# Patient Record
Sex: FEMALE | Race: OTHER | Employment: UNEMPLOYED | URBAN - METROPOLITAN AREA
[De-identification: names, ages, dates, MRNs, and addresses within clinical notes are randomized per-mention and may not be internally consistent; named-entity substitution may affect disease eponyms.]

---

## 2020-09-02 ENCOUNTER — HOSPITAL ENCOUNTER (OUTPATIENT)
Facility: CLINIC | Age: 70
Discharge: HOME | End: 2020-09-02

## 2020-09-02 LAB
TROPONIN INTERP: NORMAL
TROPONIN T: NORMAL NG/ML
TROPONIN, HIGH SENSITIVITY: 9 NG/L (ref 0–14)

## 2020-09-02 PROCEDURE — 93005 ELECTROCARDIOGRAM TRACING: CPT

## 2020-09-02 PROCEDURE — 84484 ASSAY OF TROPONIN QUANT: CPT

## 2020-09-02 PROCEDURE — 36415 COLL VENOUS BLD VENIPUNCTURE: CPT

## 2020-09-03 LAB
EKG ATRIAL RATE: 57 BPM
EKG P AXIS: 36 DEGREES
EKG P-R INTERVAL: 122 MS
EKG Q-T INTERVAL: 430 MS
EKG QRS DURATION: 98 MS
EKG QTC CALCULATION (BAZETT): 418 MS
EKG R AXIS: 39 DEGREES
EKG T AXIS: 57 DEGREES
EKG VENTRICULAR RATE: 57 BPM

## 2021-11-06 ENCOUNTER — HOSPITAL ENCOUNTER (EMERGENCY)
Age: 71
Discharge: HOME | End: 2021-11-06
Attending: EMERGENCY MEDICINE

## 2021-11-06 ENCOUNTER — APPOINTMENT (OUTPATIENT)
Dept: GENERAL RADIOLOGY | Age: 71
End: 2021-11-06

## 2021-11-06 VITALS
DIASTOLIC BLOOD PRESSURE: 70 MMHG | OXYGEN SATURATION: 97 % | BODY MASS INDEX: 24.75 KG/M2 | WEIGHT: 139.7 LBS | HEIGHT: 63 IN | HEART RATE: 66 BPM | RESPIRATION RATE: 18 BRPM | SYSTOLIC BLOOD PRESSURE: 124 MMHG | TEMPERATURE: 98.1 F

## 2021-11-06 DIAGNOSIS — U07.1 COVID-19: ICD-10-CM

## 2021-11-06 DIAGNOSIS — U07.1 COVID-19: Primary | ICD-10-CM

## 2021-11-06 LAB
ABSOLUTE EOS #: 0.1 K/UL (ref 0–0.44)
ABSOLUTE IMMATURE GRANULOCYTE: 0.03 K/UL (ref 0–0.3)
ABSOLUTE LYMPH #: 1.58 K/UL (ref 1.1–3.7)
ABSOLUTE MONO #: 0.72 K/UL (ref 0.1–1.2)
ALBUMIN SERPL-MCNC: 4.1 G/DL (ref 3.5–5.2)
ALBUMIN/GLOBULIN RATIO: ABNORMAL (ref 1–2.5)
ALP BLD-CCNC: 89 U/L (ref 35–104)
ALT SERPL-CCNC: 13 U/L (ref 5–33)
ANION GAP SERPL CALCULATED.3IONS-SCNC: 10 MMOL/L (ref 9–17)
AST SERPL-CCNC: 18 U/L
BASOPHILS # BLD: 1 % (ref 0–2)
BASOPHILS ABSOLUTE: 0.06 K/UL (ref 0–0.2)
BILIRUB SERPL-MCNC: 0.25 MG/DL (ref 0.3–1.2)
BUN BLDV-MCNC: 19 MG/DL (ref 8–23)
BUN/CREAT BLD: 27 (ref 9–20)
CALCIUM SERPL-MCNC: 9.2 MG/DL (ref 8.6–10.4)
CHLORIDE BLD-SCNC: 100 MMOL/L (ref 98–107)
CO2: 29 MMOL/L (ref 20–31)
CREAT SERPL-MCNC: 0.71 MG/DL (ref 0.5–0.9)
DIFFERENTIAL TYPE: ABNORMAL
EOSINOPHILS RELATIVE PERCENT: 1 % (ref 1–4)
GFR AFRICAN AMERICAN: >60 ML/MIN
GFR NON-AFRICAN AMERICAN: >60 ML/MIN
GFR SERPL CREATININE-BSD FRML MDRD: ABNORMAL ML/MIN/{1.73_M2}
GFR SERPL CREATININE-BSD FRML MDRD: ABNORMAL ML/MIN/{1.73_M2}
GLUCOSE BLD-MCNC: 97 MG/DL (ref 70–99)
HCT VFR BLD CALC: 36.8 % (ref 36.3–47.1)
HEMOGLOBIN: 11.7 G/DL (ref 11.9–15.1)
IMMATURE GRANULOCYTES: 0 %
LIPASE: 22 U/L (ref 13–60)
LYMPHOCYTES # BLD: 22 % (ref 24–43)
MAGNESIUM: 2.1 MG/DL (ref 1.6–2.6)
MCH RBC QN AUTO: 28 PG (ref 25.2–33.5)
MCHC RBC AUTO-ENTMCNC: 31.8 G/DL (ref 28.4–34.8)
MCV RBC AUTO: 88 FL (ref 82.6–102.9)
MONOCYTES # BLD: 10 % (ref 3–12)
NRBC AUTOMATED: 0 PER 100 WBC
PDW BLD-RTO: 13.5 % (ref 11.8–14.4)
PLATELET # BLD: 249 K/UL (ref 138–453)
PLATELET ESTIMATE: ABNORMAL
PMV BLD AUTO: 11.1 FL (ref 8.1–13.5)
POTASSIUM SERPL-SCNC: 3 MMOL/L (ref 3.7–5.3)
RBC # BLD: 4.18 M/UL (ref 3.95–5.11)
RBC # BLD: ABNORMAL 10*6/UL
SEG NEUTROPHILS: 66 % (ref 36–65)
SEGMENTED NEUTROPHILS ABSOLUTE COUNT: 4.55 K/UL (ref 1.5–8.1)
SODIUM BLD-SCNC: 139 MMOL/L (ref 135–144)
TOTAL PROTEIN: 6.9 G/DL (ref 6.4–8.3)
WBC # BLD: 7 K/UL (ref 3.5–11.3)
WBC # BLD: ABNORMAL 10*3/UL

## 2021-11-06 PROCEDURE — 83690 ASSAY OF LIPASE: CPT

## 2021-11-06 PROCEDURE — 99283 EMERGENCY DEPT VISIT LOW MDM: CPT

## 2021-11-06 PROCEDURE — 2580000003 HC RX 258: Performed by: EMERGENCY MEDICINE

## 2021-11-06 PROCEDURE — 36415 COLL VENOUS BLD VENIPUNCTURE: CPT

## 2021-11-06 PROCEDURE — 71045 X-RAY EXAM CHEST 1 VIEW: CPT

## 2021-11-06 PROCEDURE — 6360000002 HC RX W HCPCS: Performed by: EMERGENCY MEDICINE

## 2021-11-06 PROCEDURE — 85025 COMPLETE CBC W/AUTO DIFF WBC: CPT

## 2021-11-06 PROCEDURE — 96365 THER/PROPH/DIAG IV INF INIT: CPT

## 2021-11-06 PROCEDURE — 80053 COMPREHEN METABOLIC PANEL: CPT

## 2021-11-06 PROCEDURE — 83735 ASSAY OF MAGNESIUM: CPT

## 2021-11-06 PROCEDURE — 96375 TX/PRO/DX INJ NEW DRUG ADDON: CPT

## 2021-11-06 RX ORDER — DIPHENHYDRAMINE HYDROCHLORIDE 50 MG/ML
50 INJECTION INTRAMUSCULAR; INTRAVENOUS
Status: DISCONTINUED | OUTPATIENT
Start: 2021-11-06 | End: 2021-11-06 | Stop reason: HOSPADM

## 2021-11-06 RX ORDER — ONDANSETRON 2 MG/ML
4 INJECTION INTRAMUSCULAR; INTRAVENOUS ONCE
Status: COMPLETED | OUTPATIENT
Start: 2021-11-06 | End: 2021-11-06

## 2021-11-06 RX ORDER — SODIUM CHLORIDE 9 MG/ML
20 INJECTION, SOLUTION INTRAVENOUS CONTINUOUS PRN
Status: DISCONTINUED | OUTPATIENT
Start: 2021-11-06 | End: 2021-11-07 | Stop reason: HOSPADM

## 2021-11-06 RX ORDER — SODIUM CHLORIDE 0.9 % (FLUSH) 0.9 %
5-40 SYRINGE (ML) INJECTION PRN
OUTPATIENT
Start: 2021-11-08

## 2021-11-06 RX ORDER — METHYLPREDNISOLONE SODIUM SUCCINATE 125 MG/2ML
125 INJECTION, POWDER, LYOPHILIZED, FOR SOLUTION INTRAMUSCULAR; INTRAVENOUS
Status: DISCONTINUED | OUTPATIENT
Start: 2021-11-06 | End: 2021-11-06 | Stop reason: HOSPADM

## 2021-11-06 RX ORDER — METHYLPREDNISOLONE SODIUM SUCCINATE 125 MG/2ML
125 INJECTION, POWDER, LYOPHILIZED, FOR SOLUTION INTRAMUSCULAR; INTRAVENOUS
Status: CANCELLED | OUTPATIENT
Start: 2021-11-06 | End: 2021-11-06

## 2021-11-06 RX ORDER — SODIUM CHLORIDE 9 MG/ML
INJECTION, SOLUTION INTRAVENOUS CONTINUOUS PRN
Status: CANCELLED | OUTPATIENT
Start: 2021-11-06 | End: 2021-11-06

## 2021-11-06 RX ORDER — DIPHENHYDRAMINE HYDROCHLORIDE 50 MG/ML
50 INJECTION INTRAMUSCULAR; INTRAVENOUS
Status: CANCELLED | OUTPATIENT
Start: 2021-11-06 | End: 2021-11-06

## 2021-11-06 RX ORDER — SODIUM CHLORIDE 9 MG/ML
25 INJECTION, SOLUTION INTRAVENOUS PRN
OUTPATIENT
Start: 2021-11-08

## 2021-11-06 RX ORDER — EPINEPHRINE 1 MG/ML
0.3 INJECTION, SOLUTION, CONCENTRATE INTRAVENOUS PRN
Status: CANCELLED | OUTPATIENT
Start: 2021-11-06

## 2021-11-06 RX ORDER — OMEPRAZOLE 20 MG/1
20 CAPSULE, DELAYED RELEASE ORAL DAILY
COMMUNITY

## 2021-11-06 RX ORDER — POTASSIUM CHLORIDE 20 MEQ/1
40 TABLET, EXTENDED RELEASE ORAL ONCE
Status: DISCONTINUED | OUTPATIENT
Start: 2021-11-06 | End: 2021-11-07 | Stop reason: HOSPADM

## 2021-11-06 RX ORDER — HYDROCHLOROTHIAZIDE 25 MG/1
25 TABLET ORAL DAILY
COMMUNITY

## 2021-11-06 RX ORDER — SODIUM CHLORIDE 9 MG/ML
100 INJECTION, SOLUTION INTRAVENOUS CONTINUOUS PRN
Status: DISCONTINUED | OUTPATIENT
Start: 2021-11-06 | End: 2021-11-07 | Stop reason: HOSPADM

## 2021-11-06 RX ORDER — SODIUM CHLORIDE 9 MG/ML
INJECTION, SOLUTION INTRAVENOUS CONTINUOUS
OUTPATIENT
Start: 2021-11-08

## 2021-11-06 RX ADMIN — CASIRIVIMAB AND IMDEVIMAB: 600; 600 INJECTION, SOLUTION, CONCENTRATE INTRAVENOUS at 20:37

## 2021-11-06 RX ADMIN — ONDANSETRON 4 MG: 2 INJECTION INTRAMUSCULAR; INTRAVENOUS at 20:37

## 2021-11-06 ASSESSMENT — PAIN SCALES - GENERAL: PAINLEVEL_OUTOF10: 5

## 2021-11-06 ASSESSMENT — ENCOUNTER SYMPTOMS
SINUS PAIN: 1
VOMITING: 0
SHORTNESS OF BREATH: 0
COUGH: 1
ABDOMINAL PAIN: 0
SINUS PRESSURE: 1
SORE THROAT: 0
NAUSEA: 1
DIARRHEA: 0

## 2021-11-06 NOTE — ED PROVIDER NOTES
[address] [facility]  Emergency Department Encounter     Pt Name: Maricarmen Wilkes  MRN: [id number]  [place] 1950  Date of evaluation: 11/6/21  PCP:  Felice Hung MD    [address] [place] [address]       Chief Complaint   Patient presents with    Positive For Covid-19       HISTORY OF PRESENT ILLNESS  (Location/Symptom, Timing/Onset, Context/Setting, Quality, Duration, Modifying Factors, Severity.)    Maricarmen Wilkes is a 70 y.o. female who was recently diagnosed with Covid about 5 days ago presents emergency department for evaluation and infusion of monoclonal antibodies. Patient states that she has had a nonproductive cough, nasal congestion, nausea but no vomiting. Denies any headache, ear pain, sore throat, chest pain or shortness of breath. She has not had any fevers at home that she is aware of. PAST MEDICAL / SURGICAL / SOCIAL / FAMILY HISTORY    has a past medical history of Asthma, Diabetes mellitus ([address] GERD (gastroesophageal reflux disease), and Hypertension. has no past surgical history on file. Social History     Socioeconomic History    Marital status:      Spouse name: Not on file    Number of children: Not on file    Years of education: Not on file    Highest education level: Not on file   Occupational History    Not on file   Tobacco Use    Smoking status: Never Smoker    Smokeless tobacco: Never Used   Substance and Sexual Activity    Alcohol use: Never    Drug use: Never    Sexual activity: Not on file   Other Topics Concern    Not on file   Social History Narrative    Not on file     Social Determinants of Health     Financial Resource Strain:     Difficulty of Paying Living Expenses: Not on file   Food Insecurity:     Worried About Running Out of Food in the Last Year: Not on file    Dwayne of Food in the Last Year: Not on file   Transportation Needs:     Lack of Transportation (Medical):  Not on file    Lack of Transportation (Non-Medical): Not on file   Physical Activity:     Days of Exercise per Week: Not on file    Minutes of Exercise per Session: Not on file   Stress:     Feeling of Stress : Not on file   Social Connections:     Frequency of Communication with Friends and Family: Not on file    Frequency of Social Gatherings with Friends and Family: Not on file    Attends Mosque Services: Not on file    Active Member of [address] [place] [address] or Organizations: Not on file    Attends Club or Organization Meetings: Not on file    Marital Status: Not on file   Intimate Partner Violence:     Fear of Current or Ex-Partner: Not on file    Emotionally Abused: Not on file    Physically Abused: Not on file    Sexually Abused: Not on file   Housing Stability:     Unable to Pay for Housing in the Last Year: Not on file    Number of [place] in the Last Year: Not on file    Unstable Housing in the Last Year: Not on file       History reviewed. No pertinent family history. Allergies:    Patient has no known allergies. Home Medications:  Prior to Admission medications    Medication Sig Start Date End Date Taking? Authorizing Provider   metFORMIN (GLUCOPHAGE) 500 MG tablet Take 500 mg by mouth 2 times daily (with meals)   Yes Historical Provider, MD   omeprazole (PRILOSEC) 20 MG delayed release capsule Take 20 mg by mouth daily   Yes Historical Provider, MD   hydroCHLOROthiazide (HYDRODIURIL) 25 MG tablet Take 25 mg by mouth daily   Yes Historical Provider, MD       REVIEW OF SYSTEMS    (2-9 systems for level 4, 10 or more for level 5)    Review of Systems   Constitutional: Negative for chills, diaphoresis and fever. HENT: Positive for congestion, sinus pressure and sinus pain. Negative for ear pain and sore throat. Respiratory: Positive for cough. Negative for shortness of breath. Cardiovascular: Negative for chest pain. Gastrointestinal: Positive for nausea. Negative for abdominal pain, diarrhea and vomiting. Musculoskeletal: Negative for myalgias. Skin: Negative for rash. Neurological: Negative for headaches. PHYSICAL EXAM   (up to 7 for level 4, 8 or more for level 5)    VITALS:   Vitals:    11/06/21 1842 11/06/21 1843 11/06/21 2113   BP: 118/75  124/70   Pulse: 66     Resp: 18     Temp:  98.1 °F (36.7 °C)    SpO2: 98%  97%   Weight: 139 lb 11.2 oz (63.4 kg)     Height: 5' 3\" (1.6 m)         Physical Exam  Vitals and nursing note reviewed. Constitutional:       General: She is not in acute distress. Appearance: She is well-developed. She is not diaphoretic. HENT:      Head: Normocephalic and atraumatic. Right Ear: External ear normal.      Left Ear: External ear normal.      Nose: Congestion present. Eyes:      Conjunctiva/sclera: Conjunctivae normal.   Cardiovascular:      Rate and Rhythm: Normal rate and regular rhythm. Heart sounds: Normal heart sounds. No murmur heard. Pulmonary:      Effort: Pulmonary effort is normal. No respiratory distress. Breath sounds: Normal breath sounds. No wheezing, rhonchi or rales. Abdominal:      General: There is no distension. Palpations: Abdomen is soft. Tenderness: There is no abdominal tenderness. There is no guarding or rebound. Musculoskeletal:         General: Normal range of motion. Cervical back: Normal range of motion and neck supple. Right lower leg: No edema. Left lower leg: No edema. Skin:     General: Skin is warm and dry. Findings: No rash. Neurological:      General: No focal deficit present. Mental Status: She is alert.    Psychiatric:         Behavior: Behavior normal.         DIFFERENTIAL  DIAGNOSIS   PLAN (LABS / IMAGING / EKG):  Orders Placed This Encounter   Procedures    XR CHEST 1 VIEW    CBC with DIFF    Comprehensive Metabolic Panel w/ Reflex to MG    Lipase    Magnesium    Telemetry monitoring - continuous duration    Insert peripheral IV       MEDICATIONS ORDERED:  Orders Placed This Encounter   Medications    ondansetron (ZOFRAN) injection 4 mg    casirivimab-imdevimab 1,200 mg in sodium chloride 0.9 % 110 mL IVPB     Order Specific Question:   Does this patient qualify for COVID-19 antibody therapy based on criteria for treatment? Answer: Yes    0.9 % sodium chloride infusion    0.9 % sodium chloride infusion    diphenhydrAMINE (BENADRYL) injection 50 mg    hydrocortisone sodium succinate PF (SOLU-CORTEF) injection 100 mg    methylPREDNISolone sodium (SOLU-MEDROL) injection 125 mg    famotidine (PEPCID) injection 20 mg    EPINEPHrine 1 MG/ML injection 0.3 mg    potassium chloride (KLOR-CON M) extended release tablet 40 mEq     DIAGNOSTIC RESULTS / EMERGENCYDEPARTMENT COURSE / MDM   LABS:  Labs Reviewed   CBC WITH AUTO DIFFERENTIAL - Abnormal; Notable for the following components:       Result Value    Hemoglobin 11.7 (*)     Seg Neutrophils 66 (*)     Lymphocytes 22 (*)     All other components within normal limits   COMPREHENSIVE METABOLIC PANEL W/ REFLEX TO MG FOR LOW K - Abnormal; Notable for the following components:    Bun/Cre Ratio 27 (*)     Potassium 3.0 (*)     Total Bilirubin 0.25 (*)     All other components within normal limits   LIPASE   MAGNESIUM       RADIOLOGY:  XR CHEST 1 VIEW    Result Date: 11/6/2021  EXAMINATION: ONE XRAY VIEW OF THE CHEST 11/6/2021 5:08 pm COMPARISON: None. HISTORY: ORDERING SYSTEM PROVIDED HISTORY: covid + cough TECHNOLOGIST PROVIDED HISTORY: covid + cough Reason for Exam: Covid + cough Acuity: Unknown Type of Exam: Unknown FINDINGS: . The cardiac size is normal. No acute infiltrates or pleural effusions are seen. Pulmonary vascularity appears normal. There is mild ectasia of the thoracic aorta. There are degenerative changes in the spine . No acute bony abnormalities.  The hilar structures are normal.     No acute cardiopulmonary disease     EMERGENCY DEPARTMENT COURSE:  ED Course as of 11/06/21 2237   Sat Nov 06, 2021 1936 Placed call to pharmacy for monoclonal antibody infusion [AO]   2042 XR CHEST 1 VIEW [AO]   2120 CBC with DIFF(!):    WBC 7.0   RBC 4.18   Hemoglobin Quant 11.7(!)   Hematocrit 36.8   MCV 88.0   MCH 28.0   MCHC 31.8   RDW 13.5   Platelet Count 072   MPV 11.1   NRBC Automated 0.0   Differential Type NOT REPORTED   Seg Neutrophils 66(!)   Lymphocytes 22(!)   Monocytes 10   Eosinophils % 1   Basophils 1   Immature Granulocytes 0   Segs Absolute 4.55   Absolute Lymph # 1.58   Absolute Mono # 0.72   Absolute Eos # 0.10   Basophils Absolute 0.06   Absolute Immature Granulocyte 0.03   WBC Morphology NOT REPORTED   RBC Morphology NOT REPORTED   Platelet Estimate NOT REPORTED [AO]   2140 Comprehensive Metabolic Panel w/ Reflex to MG(!):    Glucose 97   BUN 19   Creatinine 0.71   Bun/Cre Ratio 27(!)   Calcium 9.2   Sodium 139   Potassium PENDING   Chloride 100   CO2 29   Anion Gap 10   Alk Phos 89   ALT 13   AST 18   Bilirubin 0.25(!)   Total Protein 6.9   Albumin 4.1   Albumin/Globulin Ratio NOT REPORTED   GFR Non- >60   GFR  >60   GFR Comment        GFR Staging NOT REPORTED [AO]   2140 Lipase:    Lipase 22 [AO]      ED Course User Index  [AO] Nathalie Loreauville Arzate 1721, DO       MDM  Number of Diagnoses or Management Options  COVID-19  Diagnosis management comments: Meets criteria for infusion due to age, history of diabetes, history of asthma, history of hypertension. No oxygen requirements at this time. Lab work here unremarkable. Chest x-ray clear. Monitored for 1 hour post infusion for any side effects. Discharged home to self-care. Post infusion instructions provided.        Amount and/or Complexity of Data Reviewed  Clinical lab tests: ordered and reviewed  Tests in the radiology section of CPT®: ordered and reviewed  Review and summarize past medical records: yes  Independent visualization of images, tracings, or specimens: yes    Patient Progress  Patient progress: stable    PROCEDURES:  Procedures     CONSULTS:  None    CRITICAL CARE:  NONE    FINAL IMPRESSION     1. COVID-19       DISPOSITION / PLAN   DISPOSITION        Evaluation and treatment course in the ED, and plan of care upon discharge was discussed in length with the patient. Patient had no further questions prior to being discharged and was instructed to return to the ED for new or worsening symptoms. Any changes to existing medications or new prescriptions were reviewed with patient and they expressed understanding of how to correctly take their medications and the possible side effects. PATIENT REFERRED TO:  Jef Ceballos MD  [address] [id number] [facility] ED  [address] [place] [address]  [phone]    As needed, If symptoms worsen      DISCHARGE MEDICATIONS:  Discharge Medication List as of 11/6/2021  9:40 PM          Charla Ruvalcaba DO  Emergency Medicine Physician    (Please note that portions of this note were completed with a voice recognition program.  Efforts were made to edit the dictations but occasionally words are mis-transcribed.)        Nathalie Arzate 1721,   11/06/21 8737

## 2021-11-07 NOTE — ED NOTES
Pt to ED via private auto. Pt reports she has had covid for 5 days. Pt is here requesting a covid antibody treatment. Pt reports having a cough. Pt denies SOB, chest pain. Pt A&Ox4, ambulatory to room. Pt spO2 97% on RA.      GEOVANI Wright  11/06/21 2125       GEOVANI Wright  11/06/21 2125

## 2021-11-08 ENCOUNTER — CARE COORDINATION (OUTPATIENT)
Dept: CARE COORDINATION | Age: 71
End: 2021-11-08

## 2021-11-08 NOTE — CARE COORDINATION
Patient contacted regarding COVID-19 diagnosis and monoclonal antibody infusion follow up. Discussed COVID-19 related testing which was available at this time. Test results were positive. Patient informed of results, if available? Yes. Ambulatory Care Manager contacted the family by telephone to perform post discharge assessment. Call within 2 business days of discharge: Yes. Verified name and  with family as identifiers. Provided introduction to self, and explanation of the CTN/ACM role, and reason for call due to risk factors for infection and/or exposure to COVID-19. Symptoms reviewed with family who verbalized the following symptoms: no worsening symptoms. Due to no new or worsening symptoms encounter was not routed to provider for escalation. Discussed follow-up appointments. If no appointment was previously scheduled, appointment scheduling offered: No.  [facility] follow up appointment(s): No future appointments. Non-[facility] follow up appointment(s): son is their doctor     Non-face-to-face services provided:  Reviewed and followed up on pending diagnostic tests and treatments-covid and post infusion      Advance Care Planning:   Does patient have an Advance Directive:  reviewed and current. Educated patient about risk for severe COVID-19 due to risk factors according to CDC guidelines. ACM reviewed discharge instructions, medical action plan and red flag symptoms with the family who verbalized understanding. Discussed COVID vaccination status: Yes. Education provided on COVID-19 vaccination as appropriate. Discussed exposure protocols and quarantine with CDC Guidelines. Family was given an opportunity to verbalize any questions and concerns and agrees to contact ACM or health care provider for questions related to their healthcare. Reviewed and educated family on any new and changed medications related to discharge diagnosis     Was patient discharged with a pulse oximeter?  No Discussed and confirmed pulse oximeter discharge instructions and when to notify provider or seek emergency care. ACM provided contact information. Plan for follow-up call in 5-7 days based on severity of symptoms and risk factors.  Spoke with pt son who is a pcp himself he said she is feeling better with antibody infusion

## 2021-11-15 ENCOUNTER — CARE COORDINATION (OUTPATIENT)
Dept: CARE COORDINATION | Age: 71
End: 2021-11-15

## 2021-11-22 ENCOUNTER — CARE COORDINATION (OUTPATIENT)
Dept: CARE COORDINATION | Age: 71
End: 2021-11-22